# Patient Record
Sex: MALE | Race: WHITE | NOT HISPANIC OR LATINO | URBAN - METROPOLITAN AREA
[De-identification: names, ages, dates, MRNs, and addresses within clinical notes are randomized per-mention and may not be internally consistent; named-entity substitution may affect disease eponyms.]

---

## 2018-03-21 ENCOUNTER — HISTORICAL (OUTPATIENT)
Dept: ADMINISTRATIVE | Facility: HOSPITAL | Age: 23
End: 2018-03-21

## 2019-10-02 ENCOUNTER — HISTORICAL (OUTPATIENT)
Dept: LAB | Facility: HOSPITAL | Age: 24
End: 2019-10-02

## 2022-04-09 ENCOUNTER — HISTORICAL (OUTPATIENT)
Dept: ADMINISTRATIVE | Facility: HOSPITAL | Age: 27
End: 2022-04-09

## 2022-04-25 VITALS
BODY MASS INDEX: 42.78 KG/M2 | HEIGHT: 69 IN | DIASTOLIC BLOOD PRESSURE: 87 MMHG | WEIGHT: 288.81 LBS | SYSTOLIC BLOOD PRESSURE: 128 MMHG

## 2022-05-02 NOTE — HISTORICAL [facility] CERNER
This is a historical note converted from Raj. Formatting and pictures may have been removed.  Please reference Raj for original formatting and attached multimedia. Chief Complaint  F/U right 4th MC hand Fx (2/9/18)--repeat xrays  History of Present Illness  22yoM- sustained fracture of right 4th metacarpale on 2/9/18 (approximately 6 weeks ago) ?after intentionally hitting a wall. Patient seen in clinic 2/28/18 placed in? lnar gutter splint at that time. Has been wearing splint at all times - has not removed since previous visit. Feels like pain has improved, elevating occasionally. Swelling improved. Has been moving fingers frequently. Remains active- riding bicycle, playing [employer]. Denies any pain today.  Review of Systems  Constitutional: no fever, no chills, no weight loss  CV: no swelling, no edema  Pulmonary: no cough  : no urinary retention, no urinary incontinence  GI: no fecal incontinence  Skin: no rash, no wound  Neuro: no numbness/tingling, no weakness, no saddle anesthesia  MSK: as above  Psych: no depression, no anxiety  Heme/Lymph: no easy bruising, no easy bleeding, no lymphadenopathy  Immuno: no MRSA history  Physical Exam  Vitals & Measurements  T:?36.8? ?C (Oral)? HR:?73(Peripheral)? RR:?20? BP:?128/87? WT:?131?kg? WT:?131?kg?  General: well developed;obese; cooperative  PSYCH: alert and oriented with?appropriate mood and affect  SKIN: inspection and palpation of skin and soft tissue normal; no scars noted on upper/lower extremities  CV: vascular integrity noted; +2 symmetrical pulses, no edema  Resp: no respiratory distress  NEURO: sensation intact by light touch; DTRs +2 bilateral and symmetrical  LYMPH: no LAD noted  ?   MSK: Hand & Wrist, Right  ?   Inspection: minimal swelling and bruising- improved from previous exam, ?no ecchymosis,?no atrophy. Nicotine staining on nails.  ?   Palpation: No tenderness  ?   ROM: Limited due to stiffness/pain? full wrist flexion, , Wrist Ulnar  deviation- radial deviation , MCP/PIP/DIP jt Flexion?- extension,? no Finger Rotational Abnl  ?   Strength: limited for swelling and pain.  ?   Neurovascular: neuro intact and pulses presents.  ?   MSK: Hand & Wrist, Left  ?  Inspection: No swelling, ecchymosis, or erythema; no atrophy  ?  Palpation: No TTP  ?  ROM: full ROM Wrist flexion-extension , Wrist Ulnar deviation-radial deviation, MCP/PIP/DIP jt Flexion?- extension  ?  Strength: 5/5? Wrist Flexion?- extension, full  strength , patent Thumb opposition, Flexor superficialis (PIP-DIP) , Extension PIP/DIP  ?  Neurovascular:? Neuro intact and pulse presents  Assessment/Plan  Closed fracture of 4th metacarpal  ?- healing appropriately, no pain on exam today, XR reviewed with patient- official read pending , improved from previous ??  - provided with Velcro ulnar gutter wrist splint today - may wear PRN  - discussed expectations regarding pain and swelling, continue elevation, use of hand - active movement as tolerated  - encouraged smoking cessation  - ER precautions reviewed  RTC: ?PRN   Problem List/Past Medical History  Ongoing  Anxiety  Bipolar  Depression  Hypertension  Morbid obesity  Thyroglossal duct cyst  Tobacco user  Historical  Anxiety  Depression  Hypertension  Procedure/Surgical History  Application of finger splint; static (02/09/2018), Immobilization of Right Finger using Splint (02/09/2018), Excision of Neck, Open Approach, Diagnostic (07/18/2016), Excision of thyroglossal duct cyst or sinus; (07/18/2016), Sistrunk Procedure (None) (07/18/2016), Other suture of other tendon of hand (07/18/2014), Repair, extensor tendon, hand, primary or secondary; without free graft, each tendon. (07/18/2014), Tendon Repair (.) (07/18/2014), FACIAL RECONSTRUCTION, pets, Tonsillectomy and adenoidectomy; age 12 or over.  Medications  Inpatient  No active inpatient medications  Home  BANOPHEN (DIPHENHYDRAMINE) 50MG CP, 50 mg= 1 cap(s), Oral, qPM  DIVALPROEX  DELAYED RELEASE 500MG TB, 500 mg= 1 tab(s), Oral, BID  Effexor  mg oral capsule, extended release, 150 mg= 1 cap(s), Oral, Daily  LISINOPRIL 20MG TABLETS, 20 mg= 1 tab(s), Oral, Daily  meloxicam 15 mg oral tablet, 15 mg= 1 tab(s), Oral, Daily, 1 refills  Remeron 15 mg oral tablet, 15 mg= 1 tab(s), Oral, Once a day (at bedtime)  Allergies  No Known Allergies  Social History  Alcohol - High Risk, 07/18/2014  Past, 12/18/2017  Past, 07/11/2016  Home/Environment  Lives with Mother, Siblings. Living situation: Home/Independent., 12/18/2017  Substance Abuse - Denies Substance Abuse, 07/18/2014  Current, Marijuana, Methamphetamines, 12/18/2017  Tobacco - High Risk, 07/18/2014  Current every day smoker, Cigarettes, 20 per day. Started age 11 Years. Previous treatment: None. Ready to change: No., 02/28/2018  Current every day smoker, Cigarettes, 4 per day. Started age 13.0 Years. Ready to change: No., 08/10/2016  Current every day smoker, Cigarettes, electronic, 20 per day. Ready to change: Yes., 06/30/2016  Family History  Diabetes mellitus type 2: Mother.  Hypertension.: Mother.  Immunizations  Vaccine Date Status   tetanus/diphtheria/pertussis, acel(Tdap) 07/18/2014 Given   Diagnostic Results  XR right hand, appropriate healing of 4th?metacarpal fracture- official read pending      Patient seen and evaluated at the time of the visit.?History of Present Illness, Physical Exam, and Assessment and Plan reviewed. Treatment plan is reasonable and appropriate. ?Clinically and radiographically healed. Compliance with treatment recommendations is important.??Radiology images independently reviewed and agree with radiologist interpretation.?- Leona Delcid MD [facility]

## 2023-09-25 DIAGNOSIS — R22.41 LOCALIZED SWELLING, MASS AND LUMP, LOWER LIMB, RIGHT: Primary | ICD-10-CM

## 2023-09-25 DIAGNOSIS — R22.2 LOCALIZED SWELLING, MASS AND LUMP, TRUNK: ICD-10-CM
